# Patient Record
Sex: FEMALE | Race: WHITE | NOT HISPANIC OR LATINO | Employment: OTHER | URBAN - METROPOLITAN AREA
[De-identification: names, ages, dates, MRNs, and addresses within clinical notes are randomized per-mention and may not be internally consistent; named-entity substitution may affect disease eponyms.]

---

## 2017-07-03 ENCOUNTER — APPOINTMENT (OUTPATIENT)
Dept: GENERAL RADIOLOGY | Facility: HOSPITAL | Age: 62
End: 2017-07-03

## 2017-07-03 ENCOUNTER — HOSPITAL ENCOUNTER (EMERGENCY)
Facility: HOSPITAL | Age: 62
Discharge: HOME | End: 2017-07-03
Attending: EMERGENCY MEDICINE | Admitting: EMERGENCY MEDICINE

## 2017-07-03 ENCOUNTER — APPOINTMENT (OUTPATIENT)
Dept: CT IMAGING | Facility: HOSPITAL | Age: 62
End: 2017-07-03
Attending: EMERGENCY MEDICINE

## 2017-07-03 VITALS
HEIGHT: 63 IN | HEART RATE: 100 BPM | RESPIRATION RATE: 20 BRPM | SYSTOLIC BLOOD PRESSURE: 122 MMHG | BODY MASS INDEX: 34.02 KG/M2 | OXYGEN SATURATION: 92 % | DIASTOLIC BLOOD PRESSURE: 76 MMHG | TEMPERATURE: 98.9 F | WEIGHT: 192 LBS

## 2017-07-03 DIAGNOSIS — M16.11 ARTHRITIS OF RIGHT HIP: Primary | ICD-10-CM

## 2017-07-03 PROCEDURE — 99283 EMERGENCY DEPT VISIT LOW MDM: CPT

## 2017-07-03 PROCEDURE — 73700 CT LOWER EXTREMITY W/O DYE: CPT

## 2017-07-03 PROCEDURE — 99284 EMERGENCY DEPT VISIT MOD MDM: CPT | Performed by: EMERGENCY MEDICINE

## 2017-07-03 PROCEDURE — 73502 X-RAY EXAM HIP UNI 2-3 VIEWS: CPT

## 2017-07-03 RX ORDER — ALPRAZOLAM 1 MG/1
1 TABLET ORAL ONCE
COMMUNITY

## 2017-07-03 RX ORDER — HYDROCODONE BITARTRATE AND ACETAMINOPHEN 10; 325 MG/1; MG/1
1 TABLET ORAL EVERY 6 HOURS PRN
COMMUNITY
End: 2017-07-03

## 2017-07-03 RX ORDER — HYDROCODONE BITARTRATE AND ACETAMINOPHEN 10; 325 MG/1; MG/1
1 TABLET ORAL EVERY 4 HOURS PRN
Qty: 18 TABLET | Refills: 0 | Status: SHIPPED | OUTPATIENT
Start: 2017-07-03

## 2017-07-03 RX ORDER — DULOXETIN HYDROCHLORIDE 20 MG/1
20 CAPSULE, DELAYED RELEASE ORAL DAILY
COMMUNITY

## 2017-07-03 RX ORDER — OXYCODONE HYDROCHLORIDE 15 MG/1
15 TABLET, FILM COATED, EXTENDED RELEASE ORAL EVERY 12 HOURS SCHEDULED
COMMUNITY

## 2017-07-03 NOTE — ED NOTES
"This nurse filed APS report on patient- pt presented to ED unbathed x several days, fingernails VERY long and curling, (pt states \"no one will cut them for me) , pt states she can't perform ADL's for herself and feels she does not get enough help with them. Adult son who lives with patient present throughout some of admission, presents with friendly and agreeable demeanor .      Jennifer Garcia RN  07/03/17 7496    "

## 2017-07-03 NOTE — ED PROVIDER NOTES
Subjective   History of Present Illness  History of Present Illness    Chief complaint: Hip pain    Location: Right hip    Quality/Severity:  Moderate    Timing/Duration: Patient fell 2 days ago and pain much worse since last evening    Modifying Factors: Patient with chronic arthritis and back pain.  Patient normally uses a walker for ambulation.  Patient states that she fell backwards and landed hard on her right buttock    Associated Symptoms: Difficulty with ambulation    Narrative: The patient is a 61-year-old white female who presents as noted above.  The patient relates that 2 days ago she lost her balance and fell backwards.  The patient was initially ambulatory with the use of her walker, but since last evening she has not been able to bear weight.  EMS has made several runs to the patient's home.    Review of Systems   Constitutional: Negative for activity change, appetite change, fatigue and fever.   HENT: Negative for congestion.    Respiratory: Positive for cough, shortness of breath (hronic) and wheezing.    Cardiovascular: Negative for chest pain, palpitations and leg swelling.   Gastrointestinal: Negative for abdominal pain, diarrhea, nausea and vomiting.   Endocrine: Negative for polydipsia.   Genitourinary: Negative for difficulty urinating, dysuria, flank pain, frequency and urgency.   Musculoskeletal: Positive for back pain (chronic) and gait problem (patient uses walker for [id number]% of the time).   Skin: Negative for rash and wound.   Neurological: Positive for weakness. Negative for dizziness and headaches.   Psychiatric/Behavioral: Negative for confusion and decreased concentration.       Past Medical History:   Diagnosis Date   • Arthritis    • Chronic back pain    • Degenerative disc disease, thoracic    • Depression    • Rheumatoid arthritis    • Spinal stenosis        Allergies   Allergen Reactions   • Morphine And Related Itching   • Penicillins        Past Surgical History:    Procedure Laterality Date   • BACK SURGERY      x4   • HYSTERECTOMY     • NECK SURGERY     • REPLACEMENT TOTAL KNEE Left    • TONSILLECTOMY AND ADENOIDECTOMY         History reviewed. No pertinent family history.    Social History     Social History   • Marital status: Single     Spouse name: N/A   • Number of children: N/A   • Years of education: N/A     Social History Main Topics   • Smoking status: Heavy Tobacco Smoker     Packs/day: 1.00   • Smokeless tobacco: None   • Alcohol use No   • Drug use: No   • Sexual activity: Not Asked     Other Topics Concern   • None     Social History Narrative   • None           Objective   Physical Exam   Constitutional: She is oriented to person, place, and time.   The patient is a chronically ill-appearing, 61-year-old, white female in no acute distress.   HENT:   Head: Normocephalic and atraumatic.   Eyes: Conjunctivae and EOM are normal.   Neck: Normal range of motion. Neck supple. No thyromegaly present.   Cardiovascular: Normal rate, regular rhythm and normal heart sounds.    No murmur heard.  Mild tachycardia   Pulmonary/Chest: Effort normal. No respiratory distress. She has wheezes (mild end-expiratory). She has no rales.   Mild tachypnea   Abdominal: Soft. Bowel sounds are normal. She exhibits no distension. There is no tenderness.   Musculoskeletal: Normal range of motion. She exhibits no edema or tenderness.   The patient does demonstrate some tenderness to palpation over the greater trochanter as well as the right ischium.  Range of motion in the right lower extremity is markedly decreased due to pain.  Moderate swelling and slight heat noted in both knees.  2+ bilateral pedal edema with the neuromuscular vascular exams intact distally.  No lower extremity deformities.  Both hands with marked deformities consistent with rheumatoid arthritis.   Lymphadenopathy:     She has no cervical adenopathy.   Neurological: She is alert and oriented to person, place, and time.    Skin: Skin is warm and dry. No rash noted. There is pallor.   Psychiatric: She has a normal mood and affect. Her behavior is normal. Judgment and thought content normal.   Nursing note and vitals reviewed.      Procedures    Final diagnoses:   Arthritis of right hip            ED Course  ED Course   Comment By Time   07/03/17, 4:31 PM  Right hip x-rays did show changes in the right hip that may have suggested an acetabular fracture.  The radiology report did confirm my visualized finding.  Patient was informed of these results and was agreeable to a CT of the hip. Misha Rico MD 07/03 1633   07/03/17, 4:50 PM  CT of the pelvis and right hip personally visualized and the final radiology report noted.  Advanced arthritis discussed with the patient and her son.  Patient will be treated symptomatically.  Son was advised that it is a certainty that the patient will require nursing home care in the near future. Misha Rico MD 07/03 8166                  MDM  Number of Diagnoses or Management Options  Arthritis of right hip: established and worsening     Amount and/or Complexity of Data Reviewed  Tests in the radiology section of CPT®: ordered and reviewed    Risk of Complications, Morbidity, and/or Mortality  Presenting problems: moderate  Diagnostic procedures: high  Management options: high      Labs this visit  Lab Results (last 24 hours)     ** No results found for the last 24 hours. **        Prescribed on discharge             Medication List      Changed          HYDROcodone-acetaminophen  MG per tablet   Commonly known as:  NORCO   Take 1 tablet by mouth Every 4 (Four) Hours As Needed for Severe Pain   (7-10) for up to 18 doses.   What changed:    - when to take this  - reasons to take this           All lab results, imaging results and other tests were reviewed by Misha Rico MD and unless otherwise specified were found to be unremarkable.      Final diagnoses:   Arthritis of  right hip            Misha Rico MD  07/03/17 8663

## 2017-07-03 NOTE — ED NOTES
Pt sleeping, son at bedside. Pt's o2 saturation was at 85%- 2lpm NC placed. Pt in no distress.     Jennifer Garcia RN  07/03/17 8109

## 2019-01-01 ENCOUNTER — APPOINTMENT (OUTPATIENT)
Dept: GENERAL RADIOLOGY | Facility: HOSPITAL | Age: 64
End: 2019-01-01

## 2019-01-01 ENCOUNTER — HOSPITAL ENCOUNTER (EMERGENCY)
Facility: HOSPITAL | Age: 64
Discharge: HOME | End: 2019-05-13
Attending: EMERGENCY MEDICINE | Admitting: EMERGENCY MEDICINE

## 2019-01-01 VITALS
HEART RATE: 94 BPM | BODY MASS INDEX: 25.72 KG/M2 | OXYGEN SATURATION: 97 % | SYSTOLIC BLOOD PRESSURE: 124 MMHG | WEIGHT: 131 LBS | HEIGHT: 60 IN | DIASTOLIC BLOOD PRESSURE: 88 MMHG | TEMPERATURE: 98.7 F | RESPIRATION RATE: 18 BRPM

## 2019-01-01 DIAGNOSIS — M16.11 OSTEOARTHRITIS OF RIGHT HIP, UNSPECIFIED OSTEOARTHRITIS TYPE: Primary | ICD-10-CM

## 2019-01-01 PROCEDURE — 25010000002 HYDROMORPHONE PER 4 MG: Performed by: EMERGENCY MEDICINE

## 2019-01-01 PROCEDURE — 99283 EMERGENCY DEPT VISIT LOW MDM: CPT

## 2019-01-01 PROCEDURE — 99282 EMERGENCY DEPT VISIT SF MDM: CPT | Performed by: EMERGENCY MEDICINE

## 2019-01-01 PROCEDURE — 96372 THER/PROPH/DIAG INJ SC/IM: CPT

## 2019-01-01 PROCEDURE — 73502 X-RAY EXAM HIP UNI 2-3 VIEWS: CPT

## 2019-01-01 RX ORDER — HYDROMORPHONE HCL 110MG/55ML
0.5 PATIENT CONTROLLED ANALGESIA SYRINGE INTRAVENOUS ONCE
Status: COMPLETED | OUTPATIENT
Start: 2019-01-01 | End: 2019-01-01

## 2019-01-01 RX ORDER — PREDNISONE 10 MG/1
TABLET ORAL
Qty: 28 TABLET | Refills: 0 | Status: SHIPPED | OUTPATIENT
Start: 2019-01-01

## 2019-01-01 RX ORDER — ACETAMINOPHEN 500 MG
500 TABLET ORAL EVERY 6 HOURS PRN
COMMUNITY

## 2019-01-01 RX ORDER — OXYCODONE HYDROCHLORIDE AND ACETAMINOPHEN 5; 325 MG/1; MG/1
TABLET ORAL
Qty: 15 TABLET | Refills: 0 | Status: SHIPPED | OUTPATIENT
Start: 2019-01-01

## 2019-01-01 RX ADMIN — HYDROMORPHONE HYDROCHLORIDE 0.5 MG: 2 INJECTION, SOLUTION INTRAMUSCULAR; INTRAVENOUS; SUBCUTANEOUS at 10:31

## 2019-05-13 NOTE — ED PROVIDER NOTES
EMERGENCY DEPARTMENT ENCOUNTER      Room Number: 1B/1B      HPI:    Chief complaint: Uncontrolled right hip pain    Location: Right hip    Quality/Severity: Severe    Timing/Duration: Months to years but acutely worse x2 weeks    Modifying Factors: Worse with ambulation    Associated Symptoms: No fever.  No new trauma.  No swelling or erythema reported    Narrative: Pt is a 63 y.o. female who presents complaining of uncontrolled right hip pain.  History of arthritis.  Patient concerned that it may be dislocated.  No trauma.    Patient was treated by physician in [place], [place] for chronic pain of the low back until 2 months ago.  She reports that he was detained by the [facility] and lost his narcotic prescription privileges.  She does not have primary care provider and does not care to have one.        PMD: Provider, No Known    REVIEW OF SYSTEMS  Review of Systems  Denies fever, trauma.  PAST MEDICAL HISTORY  Active Ambulatory Problems     Diagnosis Date Noted   • No Active Ambulatory Problems     Resolved Ambulatory Problems     Diagnosis Date Noted   • No Resolved Ambulatory Problems     Past Medical History:   Diagnosis Date   • Arthritis    • Chronic back pain    • Degenerative disc disease, thoracic    • Depression    • Rheumatoid arthritis (CMS/HCC)    • Spinal stenosis        PAST SURGICAL HISTORY  Past Surgical History:   Procedure Laterality Date   • BACK SURGERY      x4   • HYSTERECTOMY     • NECK SURGERY     • REPLACEMENT TOTAL KNEE Left    • TONSILLECTOMY AND ADENOIDECTOMY     • TUBAL ABDOMINAL LIGATION         FAMILY HISTORY  History reviewed. No pertinent family history.    SOCIAL HISTORY  Social History     Socioeconomic History   • Marital status: Single     Spouse name: Not on file   • Number of children: Not on file   • Years of education: Not on file   • Highest education level: Not on file   Tobacco Use   • Smoking status: Heavy Tobacco Smoker     Packs/day: 1.00   Substance and Sexual Activity   •  Alcohol use: No   • Drug use: No       ALLERGIES  Morphine and related and Penicillins    PHYSICAL EXAM  ED Triage Vitals [05/13/19 0931]   Temp Heart Rate Resp BP SpO2   98.7 °F (37.1 °C) 112 20 143/79 96 %      Temp src Heart Rate Source Patient Position BP Location FiO2 (%)   -- -- -- -- --       Physical Exam   Constitutional: She is oriented to person, place, and time.   Appears much older than stated age.  Advanced arthritic changes noted to bilateral hands.  Moans quite a bit.  Not overtly toxic appearing.  Generally poor eye contact.   HENT:   Head: Normocephalic.   Eyes: Conjunctivae are normal.   Neck:   Painless range of motion noted   Cardiovascular: Normal rate and regular rhythm.   Pulmonary/Chest: Effort normal. No respiratory distress.   Diminished throughout.  Smells strongly of cigarette smoke   Musculoskeletal:        Legs:  Neurological: She is alert and oriented to person, place, and time. GCS score is 15.   Skin: Skin is warm and dry.   Psychiatric:   Flat affect.  Poor eye contact       LAB RESULTS  No results found for this or any previous visit.      I ordered the above labs and reviewed the results    RADIOLOGY  Xr Hip With Or Without Pelvis 2 - 3 View Right    Result Date: 5/13/2019  Narrative: PELVIS AND HIPS, 05/13/2019     HISTORY: 63-year-old female in the ED complaining of new onset severe right hip pain today. She has a history of rheumatoid arthritis. History of fall two years ago. No recent traumatic injury.  TECHNIQUE: AP pelvis with bilateral two-view hip series.  FINDINGS: There is severe bilateral symmetric hip joint space narrowing with chronic acetabular remodeling and chronic axial hip migration, typical for rheumatoid arthritis. There are also secondary degenerative changes involving both hip joints, very severe on the right and moderately severe on the left.  No acute or chronic fracture is noted involving the pelvis or hips. No joint dislocation. Severe degenerative  changes involving the visualized lower lumbar spine.      Impression: 1. Severe chronic erosive arthropathy involving both hips, typical for the patient's known rheumatoid arthritis. Severe secondary degenerative changes, much greater on the right. 2. No acute osseous abnormality.  This report was finalized on 5/13/2019 11:23 AM by Dr. Yasmany Bush MD.        I ordered the above radiologic testing and reviewed the results    PROCEDURES  Procedures      PROGRESS AND CONSULTS  ED Course as of May 13 1129   Mon May 13, 2019   1117 Patient was seen at this facility in 2017 for similar complaint.  X-ray and CT scan of hip and pelvis revealed severe advanced arthritic changes especially of the right hip.  Per EMR, Dr. Rico discussed with the patient likely need for nursing home care.  Patient recalls this conversation and adamantly continues to deny any plan for such care.  [RS]      ED Course User Index  [RS] Salinas Munguia MD           MEDICAL DECISION MAKING  Results were reviewed/discussed with the patient and they were also made aware of online access. Pt also made aware that some labs, such as cultures, will not be resulted during ER visit and follow up with PMD is necessary.     ASHVIN Mcarthur query complete.  Corroborates her story of abrupt discontinuation of narcotics and benzodiazepines approximately 2 months ago.    DIAGNOSIS  Final diagnoses:   Severe advanced osteoarthritis of right hip, unspecified osteoarthritis type       Latest Documented Vital Signs:  As of 11:29 AM  BP- 143/79 HR- 112 Temp- 98.7 °F (37.1 °C) O2 sat- 96%    DISPOSITION  Discharge-stable       Medication List      New Prescriptions    oxyCODONE-acetaminophen 5-325 MG per tablet  Commonly known as:  PERCOCET  Take 1 tab by mouth every 6 hours as needed for pain     predniSONE 10 MG tablet  Commonly known as:  DELTASONE  Take 4 tablets by mouth daily for 5 days then 2 tablets by mouth daily for   3 days then 1 tablet by mouth  daily for 2 days          Follow-up Information     Schedule an appointment as soon as possible for a visit  with [facility].    Contact information:  [address] [facility] [address]  [phone]           Schedule an appointment as soon as possible for a visit  with Jozef Westbrook MD.    Specialties:  Orthopedic Surgery, Sports Medicine  Contact information:  [address]  [address] [place] [address]  [phone]                          Salinas Munguia MD  05/13/19 1129

## 2020-01-01 ENCOUNTER — HOSPITAL ENCOUNTER (EMERGENCY)
Facility: HOSPITAL | Age: 65
End: 2020-03-01
Attending: EMERGENCY MEDICINE | Admitting: EMERGENCY MEDICINE

## 2020-01-01 VITALS — OXYGEN SATURATION: 100 % | HEIGHT: 65 IN | BODY MASS INDEX: 27.49 KG/M2 | WEIGHT: 165 LBS | RESPIRATION RATE: 18 BRPM

## 2020-01-01 DIAGNOSIS — I46.9 CARDIAC ARREST (HCC): Primary | ICD-10-CM

## 2020-01-01 PROCEDURE — 99284 EMERGENCY DEPT VISIT MOD MDM: CPT

## 2020-01-01 PROCEDURE — 94799 UNLISTED PULMONARY SVC/PX: CPT

## 2020-01-01 PROCEDURE — 99282 EMERGENCY DEPT VISIT SF MDM: CPT | Performed by: EMERGENCY MEDICINE

## 2020-03-01 NOTE — ED PROVIDER NOTES
Artemio Wadsworth is a 63 yo WF who presents in full arrest.  Patient lives by herself.  A neighbor helps her with daily activities.  Ms. Wadsworth had been talking with the neighbor this morning.  She drank a Coke.  The neighbor help Ms. Wadsworth onto a bedside toilet.  Ms. Wadsworth suddenly slumped over while on the bedside toilet.  She was unresponsive.  Neighbor called EMS as well as the patient's son.  EMS received a call at 8:04 AM.  When they arrived on the scene the patient was unresponsive, pulseless and apneic.  Patient was placed on the monitor.  CPR was started.  The patient was intubated.  Patient was defibrillated x1 without any response.  She was given multiple rounds of epinephrine.  Upon ER arrival patient was still unresponsive, apneic and pulseless.  CPR was continued.      Cardiac Arrest   Witnessed by:  Friend  Incident location:  Home  Time since incident:  50 minutes  Condition upon EMS arrival:  Apneic and unresponsive  Pulse:  Absent  Initial cardiac rhythm per EMS:  PEA  Treatments prior to arrival:  ACLS protocol and intubation  Medications given prior to ED:  Epinephrine  Airway:  Intubation prior to arrival  Rhythm on admission to ED:  PEA  Associated symptoms comment:  None reported  Risk factors: no COPD, no diabetes mellitus, no drug overdose, no head injury, no heart problem, no hyperlipidemia and no trauma        Review of Systems   Unable to perform ROS: Patient unresponsive       Past Medical History:   Diagnosis Date   • Arthritis    • Chronic back pain    • Degenerative disc disease, thoracic    • Depression    • Rheumatoid arthritis (CMS/HCC)    • Spinal stenosis        Allergies   Allergen Reactions   • Morphine And Related Itching   • Penicillins        Past Surgical History:   Procedure Laterality Date   • BACK SURGERY      x4   • HYSTERECTOMY     • NECK SURGERY     • REPLACEMENT TOTAL KNEE Left    • TONSILLECTOMY AND ADENOIDECTOMY     • TUBAL ABDOMINAL LIGATION         History  reviewed. No pertinent family history.    Social History     Socioeconomic History   • Marital status: Single     Spouse name: Not on file   • Number of children: Not on file   • Years of education: Not on file   • Highest education level: Not on file   Tobacco Use   • Smoking status: Heavy Tobacco Smoker     Packs/day: 1.00   Substance and Sexual Activity   • Alcohol use: No   • Drug use: No           Objective   Physical Exam   Constitutional: She appears well-developed and well-nourished. No distress. She is intubated.   64-year-old white female lying in bed.  Patient is unresponsive.  She is cyanotic.  No spontaneous movement.  No respiratory effort.  She is intubated.  CPR ongoing.   HENT:   Head: Normocephalic and atraumatic.   Right Ear: External ear normal.   Left Ear: External ear normal.   Nose: Nose normal.   Eyes: Right conjunctiva is injected. Left conjunctiva is injected. Right pupil is not reactive. Left pupil is not reactive.   Cardiovascular:   No heart sounds heard.   Pulmonary/Chest: Apnea noted. She is intubated.           Abdominal: Soft. She exhibits no distension. There is no tenderness.   Musculoskeletal:   Patient's hands show evidence of advanced rheumatoid arthritis.   Neurological: She has normal reflexes.   Skin: Skin is warm and dry. She is not diaphoretic.   Nursing note and vitals reviewed.      Procedures           ED Course  ED Course as of Mar 01 1123   Sun Mar 01, 2020   0934 Patient in PEA on arrival.  Intubated.  CPR ongoing.  Patient's pupils are fixed and dilated.  Heart sounds absent.  Breath sounds absent.  No respiratory efforts being made.  No movement of the body is present.  Bedside ultrasound showed the heart was motionless.Patient has had CPR ongoing for approximately 50 minutes on ER arrival.  CPR was continued briefly while evaluation was performed.  With the downtime, fixed pupils, no pulse and no hard activity I did not feel further CPR was beneficial.  Code  called.  Time of death 9:07 AM.    [SS]   1122 Discussed case with corner.    [SS]      ED Course User Index  [SS] Jere Garza MD                                           [facility]    Final diagnoses:   Cardiac arrest (CMS/HCC)            Jere Garza MD  03/01/20 1123

## 2020-03-01 NOTE — ED NOTES
Patient arrived to ER via [facility] in full arrest at 0901 times are incorrect on vitals and charting due to registration time.     Shruthi Hopper RN  03/01/20 0946

## 2020-03-01 NOTE — ED NOTES
[facility] notified of pts death and states they will call back after pt's son has had time with his mother.     Pradeep Jacobs RN  03/01/20 0954